# Patient Record
Sex: FEMALE | Race: WHITE | NOT HISPANIC OR LATINO | URBAN - METROPOLITAN AREA
[De-identification: names, ages, dates, MRNs, and addresses within clinical notes are randomized per-mention and may not be internally consistent; named-entity substitution may affect disease eponyms.]

---
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
## 2024-11-19 PROCEDURE — 87081 CULTURE SCREEN ONLY: CPT

## 2025-07-25 PROCEDURE — 87086 URINE CULTURE/COLONY COUNT: CPT

## 2025-07-26 ENCOUNTER — HOSPITAL ENCOUNTER (EMERGENCY)
Facility: HOSPITAL | Age: 20
Discharge: HOME | End: 2025-07-26
Attending: EMERGENCY MEDICINE
Payer: COMMERCIAL

## 2025-07-26 ENCOUNTER — APPOINTMENT (OUTPATIENT)
Dept: CT IMAGING | Facility: HOSPITAL | Age: 20
End: 2025-07-26
Payer: COMMERCIAL

## 2025-07-26 VITALS
HEIGHT: 64 IN | HEART RATE: 87 BPM | TEMPERATURE: 98.1 F | DIASTOLIC BLOOD PRESSURE: 55 MMHG | RESPIRATION RATE: 18 BRPM | SYSTOLIC BLOOD PRESSURE: 107 MMHG | OXYGEN SATURATION: 99 % | WEIGHT: 144.62 LBS | BODY MASS INDEX: 24.69 KG/M2

## 2025-07-26 DIAGNOSIS — R10.2 SUPRAPUBIC PAIN: ICD-10-CM

## 2025-07-26 DIAGNOSIS — R10.31 RIGHT LOWER QUADRANT ABDOMINAL PAIN: Primary | ICD-10-CM

## 2025-07-26 LAB
ALBUMIN SERPL-MCNC: 4.9 G/DL (ref 3.5–5.2)
ALBUMIN/GLOB SERPL: 1.8 G/DL
ALP SERPL-CCNC: 86 U/L (ref 39–117)
ALT SERPL W P-5'-P-CCNC: 15 U/L (ref 1–33)
ANION GAP SERPL CALCULATED.3IONS-SCNC: 10.1 MMOL/L (ref 5–15)
AST SERPL-CCNC: 24 U/L (ref 1–32)
BACTERIA UR QL AUTO: ABNORMAL /HPF
BASOPHILS # BLD AUTO: 0.08 10*3/MM3 (ref 0–0.2)
BASOPHILS NFR BLD AUTO: 1.7 % (ref 0–1.5)
BILIRUB SERPL-MCNC: 0.3 MG/DL (ref 0–1.2)
BILIRUB UR QL STRIP: NEGATIVE
BUN SERPL-MCNC: 13.7 MG/DL (ref 6–20)
BUN/CREAT SERPL: 14 (ref 7–25)
CALCIUM SPEC-SCNC: 9.8 MG/DL (ref 8.6–10.5)
CHLORIDE SERPL-SCNC: 104 MMOL/L (ref 98–107)
CLARITY UR: CLEAR
CO2 SERPL-SCNC: 25.9 MMOL/L (ref 22–29)
COLOR UR: YELLOW
CREAT SERPL-MCNC: 0.98 MG/DL (ref 0.57–1)
DEPRECATED RDW RBC AUTO: 39.8 FL (ref 37–54)
EGFRCR SERPLBLD CKD-EPI 2021: 84.4 ML/MIN/1.73
EOSINOPHIL # BLD AUTO: 0.12 10*3/MM3 (ref 0–0.4)
EOSINOPHIL NFR BLD AUTO: 2.5 % (ref 0.3–6.2)
ERYTHROCYTE [DISTWIDTH] IN BLOOD BY AUTOMATED COUNT: 12.1 % (ref 12.3–15.4)
GLOBULIN UR ELPH-MCNC: 2.8 GM/DL
GLUCOSE SERPL-MCNC: 83 MG/DL (ref 65–99)
GLUCOSE UR STRIP-MCNC: NEGATIVE MG/DL
HCG INTACT+B SERPL-ACNC: <0.5 MIU/ML
HCT VFR BLD AUTO: 41 % (ref 34–46.6)
HGB BLD-MCNC: 13.5 G/DL (ref 12–15.9)
HGB UR QL STRIP.AUTO: NEGATIVE
HOLD SPECIMEN: NORMAL
HOLD SPECIMEN: NORMAL
HYALINE CASTS UR QL AUTO: ABNORMAL /LPF
IMM GRANULOCYTES # BLD AUTO: 0 10*3/MM3 (ref 0–0.05)
IMM GRANULOCYTES NFR BLD AUTO: 0 % (ref 0–0.5)
KETONES UR QL STRIP: NEGATIVE
LEUKOCYTE ESTERASE UR QL STRIP.AUTO: ABNORMAL
LYMPHOCYTES # BLD AUTO: 1.96 10*3/MM3 (ref 0.7–3.1)
LYMPHOCYTES NFR BLD AUTO: 40.7 % (ref 19.6–45.3)
MCH RBC QN AUTO: 29.7 PG (ref 26.6–33)
MCHC RBC AUTO-ENTMCNC: 32.9 G/DL (ref 31.5–35.7)
MCV RBC AUTO: 90.1 FL (ref 79–97)
MONOCYTES # BLD AUTO: 0.52 10*3/MM3 (ref 0.1–0.9)
MONOCYTES NFR BLD AUTO: 10.8 % (ref 5–12)
NEUTROPHILS NFR BLD AUTO: 2.13 10*3/MM3 (ref 1.7–7)
NEUTROPHILS NFR BLD AUTO: 44.3 % (ref 42.7–76)
NITRITE UR QL STRIP: NEGATIVE
NRBC BLD AUTO-RTO: 0 /100 WBC (ref 0–0.2)
PH UR STRIP.AUTO: 6 [PH] (ref 5–8)
PLATELET # BLD AUTO: 220 10*3/MM3 (ref 140–450)
PMV BLD AUTO: 9.9 FL (ref 6–12)
POTASSIUM SERPL-SCNC: 4 MMOL/L (ref 3.5–5.2)
PROT SERPL-MCNC: 7.7 G/DL (ref 6–8.5)
PROT UR QL STRIP: NEGATIVE
RBC # BLD AUTO: 4.55 10*6/MM3 (ref 3.77–5.28)
RBC # UR STRIP: ABNORMAL /HPF
REF LAB TEST METHOD: ABNORMAL
SODIUM SERPL-SCNC: 140 MMOL/L (ref 136–145)
SP GR UR STRIP: 1.02 (ref 1–1.03)
SQUAMOUS #/AREA URNS HPF: ABNORMAL /HPF
UROBILINOGEN UR QL STRIP: ABNORMAL
WBC # UR STRIP: ABNORMAL /HPF
WBC NRBC COR # BLD AUTO: 4.81 10*3/MM3 (ref 3.4–10.8)
WHOLE BLOOD HOLD COAG: NORMAL
WHOLE BLOOD HOLD SPECIMEN: NORMAL

## 2025-07-26 PROCEDURE — 25810000003 SODIUM CHLORIDE 0.9 % SOLUTION

## 2025-07-26 PROCEDURE — 96360 HYDRATION IV INFUSION INIT: CPT

## 2025-07-26 PROCEDURE — 81001 URINALYSIS AUTO W/SCOPE: CPT | Performed by: EMERGENCY MEDICINE

## 2025-07-26 PROCEDURE — 99285 EMERGENCY DEPT VISIT HI MDM: CPT

## 2025-07-26 PROCEDURE — 36415 COLL VENOUS BLD VENIPUNCTURE: CPT

## 2025-07-26 PROCEDURE — 85025 COMPLETE CBC W/AUTO DIFF WBC: CPT

## 2025-07-26 PROCEDURE — 80053 COMPREHEN METABOLIC PANEL: CPT | Performed by: EMERGENCY MEDICINE

## 2025-07-26 PROCEDURE — 84702 CHORIONIC GONADOTROPIN TEST: CPT | Performed by: EMERGENCY MEDICINE

## 2025-07-26 PROCEDURE — 25510000001 IOPAMIDOL PER 1 ML: Performed by: EMERGENCY MEDICINE

## 2025-07-26 PROCEDURE — 74177 CT ABD & PELVIS W/CONTRAST: CPT

## 2025-07-26 RX ORDER — ONDANSETRON 4 MG/1
4 TABLET, ORALLY DISINTEGRATING ORAL 4 TIMES DAILY PRN
Qty: 10 TABLET | Refills: 0 | Status: SHIPPED | OUTPATIENT
Start: 2025-07-26

## 2025-07-26 RX ORDER — IOPAMIDOL 755 MG/ML
100 INJECTION, SOLUTION INTRAVASCULAR
Status: COMPLETED | OUTPATIENT
Start: 2025-07-26 | End: 2025-07-26

## 2025-07-26 RX ORDER — IBUPROFEN 600 MG/1
600 TABLET, FILM COATED ORAL EVERY 6 HOURS PRN
Qty: 20 TABLET | Refills: 0 | Status: SHIPPED | OUTPATIENT
Start: 2025-07-26

## 2025-07-26 RX ADMIN — IOPAMIDOL 75 ML: 755 INJECTION, SOLUTION INTRAVENOUS at 12:48

## 2025-07-26 RX ADMIN — SODIUM CHLORIDE 1000 ML: 9 INJECTION, SOLUTION INTRAVENOUS at 12:56

## 2025-07-26 NOTE — ED PROVIDER NOTES
Time: 11:26 AM EDT  Date of encounter:  7/26/2025  Room: [facility]    Independent Historian/Clinical History and Information was obtained by:   Patient    History is limited by: N/A    Chief Complaint: abdominal pain       History of Present Illness:  Patient is a 20 y.o. year old female who presents to the emergency department for evaluation of right lower abdominal and right suprapubic pain.  Patient states her pain started abruptly yesterday morning at around 8:00.  The pain lasted for approximately 4 hours and did improve.  It then returned and has been constant since.  She did go to a local urgent care center where she was diagnosed with a urinary tract infection and prescribed antibiotics.  She however has had no dysuria and no burning.  She has had a slight increase in urine frequency but states for the most part she has had no symptoms of a urinary tract infection.  She also denies nausea, vomiting, diarrhea, fever, or chills.  She does not have menstrual periods due to current birth control meds.  There is a family history of kidney stones however she has never had one.  She also has concerns about a possible ruptured ovarian cyst.  At rest her pain is 0 but does increase with movement and palpation.      Patient Care Team  Primary Care Provider: Patricia Lemon APRN    Past Medical History:     No Known Allergies  History reviewed. No pertinent past medical history.  History reviewed. No pertinent surgical history.  History reviewed. No pertinent family history.    Home Medications:  Prior to Admission medications    Medication Sig Start Date End Date Taking? Authorizing Provider   cetirizine (zyrTEC) 10 MG tablet TAKE 1 TABLET BY MOUTH EVERY DAY FOR 30 DAYS    ProviderSalazar MD   fluticasone (Flonase Allergy Relief) 50 MCG/ACT nasal spray Daily.    Salazar Davila MD   levocetirizine (XYZAL) 5 MG tablet Daily. 8/12/24 8/7/25  Salazar Davila MD   nitrofurantoin, macrocrystal-monohydrate,  "(MACROBID) 100 MG capsule Take 1 capsule by mouth 2 (Two) Times a Day for 7 days. 7/25/25 8/1/25  Marilyn Joe PA-C        Social History:   Social History     Tobacco Use    Smoking status: Never     Passive exposure: Never    Smokeless tobacco: Never   Vaping Use    Vaping status: Never Used   Substance Use Topics    Alcohol use: Never    Drug use: Defer         Review of Systems:  Review of Systems     I performed a review of systems today, which was all negative, except for the positives found in the HPI above.      Physical Exam:  /81 (Patient Position: Sitting)   Pulse 72   Temp 98.1 °F (36.7 °C) (Oral)   Resp 16   Ht 162.6 cm (64\")   Wt 65.6 kg (144 lb 10 oz)   SpO2 100%   BMI 24.82 kg/m²     Physical Exam   General:  Awake alert no apparent distress    Head: Normocephalic, atraumatic, eyes PERRLA EOMI, nose normal, oropharynx normal    Neck: Supple, no meningismus, no cervical lymphadenopathy or JVD    Heart: Regular rate and rhythm, no murmurs or rubs, 2+ radial pulses    Lungs: Clear to auscultation bilaterally, no wheezing or rhonchi or rales, no respiratory distress    Abdomen: Soft, nontender, nondistended, no signs of peritonitis. Mild tenderness to deep palpation to right suprapubic area. At rest pt has no pain.     Skin: Warm, dry, no rash    Musculoskeletal: Normal range of motion, no obvious deformities, no edema noted    Neurologic: Awake, alert, oriented x 3, no motor or sensory deficits appreciated    Psych: No suicidal or homicidal ideations, no psychosis              Medical Decision Making:      Comorbidities that affect care:    None    External Notes reviewed:    Previous Clinic Note: Reviewed patient's urgent care note from yesterday and Previous Labs: Reviewed patient's labs completed at urgent care yesterday specifically urinalysis for comparison and trending purposes      The following orders were placed and all results were independently analyzed by me:  Orders Placed " This Encounter   Procedures    CT Abdomen Pelvis With Contrast    [place] Draw    Comprehensive Metabolic Panel    Urinalysis With Culture If Indicated - Urine, Clean Catch    CBC Auto Differential    Urinalysis, Microscopic Only - Urine, Clean Catch    hCG, Quantitative, Pregnancy    CBC & Differential    Green Top (Gel)    Lavender Top    Gold Top - SST    Light Blue Top       Medications Given in the Emergency Department:  Medications   sodium chloride 0.9 % bolus 1,000 mL (1,000 mL Intravenous New Bag 7/26/25 1256)   iopamidol (ISOVUE-370) 76 % injection 100 mL (75 mL Intravenous Given 7/26/25 1248)        ED Course:    ED Course as of 07/26/25 1547   Sat Jul 26, 2025   1537 I have updated patient with all results.we discussed her normal labs as well as CT findings.  We discussed the possibility that this could be ovarian related due to her family history of ovarian cyst.  Patient confirms that she has an OB/GYN to follow-up with if she continues to have pain.  Patient continues to have no pain at this time. [MS]      ED Course User Index  [MS] Diamante Taylor APRN       Labs:    Lab Results (last 24 hours)       Procedure Component Value Units Date/Time    POC Urinalysis Dipstick, Multipro (Automated Dipstick) [[id number]]  (Abnormal) Collected: 07/25/25 1933    Specimen: Urine Updated: 07/25/25 1934     Color Other     Clarity, UA Slightly Cloudy     Glucose, UA Negative mg/dL      Bilirubin Negative     Ketones, UA Negative     Specific Gravity  1.005     Blood, UA Negative     pH, Urine 6.0     Protein, POC Negative mg/dL      Urobilinogen, UA 0.2 E.U./dL     Nitrite, UA Negative     Leukocytes Small (1+)    POC Pregnancy, Urine [[id number]]  (Normal) Collected: 07/25/25 1935    Specimen: Urine Updated: 07/25/25 1937     HCG, Urine, QL Negative     Lot Number #[id number]     Internal Positive Control Passed     Internal Negative Control Passed     Expiration Date 10/17/26    Urine Culture - Urine,  Urine, Clean Catch [[id number]] Collected: 07/25/25 2001    Specimen: Urine, Clean Catch Updated: 07/25/25 2003    CBC & Differential [[id number]]  (Abnormal) Collected: 07/26/25 1059    Specimen: Blood Updated: 07/26/25 1106    Narrative:      The following orders were created for panel order CBC & Differential.  Procedure                               Abnormality         Status                     ---------                               -----------         ------                     CBC Auto Differential[[id number]]        Abnormal            Final result                 Please view results for these tests on the individual orders.    Comprehensive Metabolic Panel [[id number]] Collected: 07/26/25 1059    Specimen: Blood Updated: 07/26/25 1121     Glucose 83 mg/dL      BUN 13.7 mg/dL      Creatinine 0.98 mg/dL      Sodium 140 mmol/L      Potassium 4.0 mmol/L      Chloride 104 mmol/L      CO2 25.9 mmol/L      Calcium 9.8 mg/dL      Total Protein 7.7 g/dL      Albumin 4.9 g/dL      ALT (SGPT) 15 U/L      AST (SGOT) 24 U/L      Alkaline Phosphatase 86 U/L      Total Bilirubin 0.3 mg/dL      Globulin 2.8 gm/dL      A/G Ratio 1.8 g/dL      BUN/Creatinine Ratio 14.0     Anion Gap 10.1 mmol/L      eGFR 84.4 mL/min/1.73     Narrative:      GFR Categories in Chronic Kidney Disease (CKD)              GFR Category          GFR (mL/min/1.73)    Interpretation  G1                    90 or greater        Normal or high (1)  G2                    60-89                Mild decrease (1)  G3a                   45-59                Mild to moderate decrease  G3b                   30-44                Moderate to severe decrease  G4                    15-29                Severe decrease  G5                    14 or less           Kidney failure    (1)In the absence of evidence of kidney disease, neither GFR category G1 or G2 fulfill the criteria for CKD.    eGFR calculation 2021 CKD-EPI creatinine equation, which does not include race as  a factor    CBC Auto Differential [[id number]]  (Abnormal) Collected: 07/26/25 1059    Specimen: Blood Updated: 07/26/25 1106     WBC 4.81 10*3/mm3      RBC 4.55 10*6/mm3      Hemoglobin 13.5 g/dL      Hematocrit 41.0 %      MCV 90.1 fL      MCH 29.7 pg      MCHC 32.9 g/dL      RDW 12.1 %      RDW-SD 39.8 fl      MPV 9.9 fL      Platelets 220 10*3/mm3      Neutrophil % 44.3 %      Lymphocyte % 40.7 %      Monocyte % 10.8 %      Eosinophil % 2.5 %      Basophil % 1.7 %      Immature Grans % 0.0 %      Neutrophils, Absolute 2.13 10*3/mm3      Lymphocytes, Absolute 1.96 10*3/mm3      Monocytes, Absolute 0.52 10*3/mm3      Eosinophils, Absolute 0.12 10*3/mm3      Basophils, Absolute 0.08 10*3/mm3      Immature Grans, Absolute 0.00 10*3/mm3      nRBC 0.0 /100 WBC     hCG, Quantitative, Pregnancy [[id number]] Collected: 07/26/25 1059    Specimen: Blood Updated: 07/26/25 1231     HCG Quantitative <0.50 mIU/mL     Narrative:      HCG Ranges by Gestational Age    Females - non-pregnant premenopausal   </= 1mIU/mL HCG  Females - postmenopausal               </= 7mIU/mL HCG    3 Weeks       5.4   -      72 mIU/mL  4 Weeks      10.2   -     708 mIU/mL  5 Weeks       217   -   8,245 mIU/mL  6 Weeks       152   -  32,177 mIU/mL  7 Weeks     4,059   - 153,767 mIU/mL  8 Weeks    31,366   - 149,094 mIU/mL  9 Weeks    59,109   - 135,901 mIU/mL  10 Weeks   44,186   - 170,409 mIU/mL  12 Weeks   27,107   - 201,615 mIU/mL  14 Weeks   24,302   -  93,646 mIU/mL  15 Weeks   12,540   -  69,747 mIU/mL  16 Weeks    8,904   -  55,332 mIU/mL  17 Weeks    8,240   -  51,793 mIU/mL  18 Weeks    9,649   -  55,271 mIU/mL      Urinalysis With Culture If Indicated - Urine, Clean Catch [[id number]]  (Abnormal) Collected: 07/26/25 1122    Specimen: Urine, Clean Catch Updated: 07/26/25 1134     Color, UA Yellow     Appearance, UA Clear     pH, UA 6.0     Specific Gravity, UA 1.019     Glucose, UA Negative     Ketones, UA Negative     Bilirubin, UA Negative      Blood, UA Negative     Protein, UA Negative     Leuk Esterase, UA Trace     Nitrite, UA Negative     Urobilinogen, UA 0.2 E.U./dL    Narrative:      In absence of clinical symptoms, the presence of pyuria, bacteria, and/or nitrites on the urinalysis result does not correlate with infection.    Urinalysis, Microscopic Only - Urine, Clean Catch [[id number]]  (Abnormal) Collected: 07/26/25 1122    Specimen: Urine, Clean Catch Updated: 07/26/25 1134     RBC, UA 0-2 /HPF      WBC, UA 3-5 /HPF      Comment: Urine culture not indicated.        Bacteria, UA 1+ /HPF      Squamous Epithelial Cells, UA 3-6 /HPF      Hyaline Casts, UA None Seen /LPF      Methodology Automated Microscopy             Imaging:    CT Abdomen Pelvis With Contrast  Result Date: 7/26/2025  CT ABDOMEN PELVIS W CONTRAST Date of Exam: 7/26/2025 12:41 PM EDT Indication: right lower quadrant pain. Comparison: None available. Technique: Axial CT images were obtained of the abdomen and pelvis after the uneventful intravenous administration of iodinated contrast. Reconstructed coronal and sagittal images were also obtained. Automated exposure control and iterative construction methods were used. FINDINGS: Lung bases: No masses. No consolidation. Liver:No masses. No intrahepatic biliary ductal dilatation. Spleen:No masses. No perisplenic hematoma. Pancreas:No pancreatic masses. No evidence of pancreatitis. Gallbladder and common bile duct:No evidence of cholelithiasis. No evidence of cholecystitis. Adrenal glands:No adrenal masses Kidneys and ureters:No kidney stones. No renal masses.No calculi present within the ureters. Normal caliber ureters. Urinary bladder:No urinary bladder wall thickening. No bladder masses. Small bowel:Normal caliber small bowel. Large bowel:No diverticulosis or diverticulitis. No large bowel masses are appreciated Appendix: Normal appendix GENITOURINARY: The uterus and adnexa are normal. Ascites or pneumoperitoneum:None.  Adenopathy:None present Osseous structures: The proximal femurs are intact. The pubic bones are intact. The sacrum and sacroiliac joints are normal. Lumbar vertebral body height and alignment are normal. The sacrum is intact. Other findings: None     Normal appendix. No acute abdominal or pelvic pathology. Electronically Signed: Daljit Fitzgerald MD  7/26/2025 1:08 PM EDT  Workstation ID: [id number]        Differential Diagnosis and Discussion:    Abdominal Pain: Based on the patient's signs and symptoms, I considered abdominal aortic aneurysm, small bowel obstruction, pancreatitis, acute cholecystitis, acute appendecitis, peptic ulcer disease, gastritis, colitis, endocrine disorders, irritable bowel syndrome and other differential diagnosis an etiology of the patient's abdominal pain.    PROCEDURES:    Labs were collected in the emergency department and all labs were reviewed and interpreted by me.  CT scan was performed in the emergency department and the CT scan radiology impression was interpreted by me.    No orders to display       Procedures    MDM     Amount and/or Complexity of Data Reviewed  Clinical lab tests: reviewed                       Patient Care Considerations:    SEPSIS was considered but is NOT present in the emergency department as SIRS criteria is not present.      Consultants/Shared Management Plan:        Social Determinants of Health:    Patient is independent, reliable, and has access to care.       Disposition and Care Coordination:    Discharged: The patient is suitable and stable for discharge with no need for consideration of admission.    I have explained the patient´s condition, diagnoses and treatment plan based on the information available to me at this time. I have answered questions and addressed any concerns. The patient has a good  understanding of the patient´s diagnosis, condition, and treatment plan as can be expected at this point. The vital signs have been stable. The patient´s  condition is stable and appropriate for discharge from the emergency department.      The patient will pursue further outpatient evaluation with the primary care physician or other designated or consulting physician as outlined in the discharge instructions. They are agreeable to this plan of care and follow-up instructions have been explained in detail. The patient has received these instructions in written format and has expressed an understanding of the discharge instructions. The patient is aware that any significant change in condition or worsening of symptoms should prompt an immediate return to this or the closest emergency department or call to 911.    Final diagnoses:   Right lower quadrant abdominal pain   Suprapubic pain        ED Disposition       ED Disposition   Discharge    Condition   Stable    Comment   --               This medical record created using voice recognition software.           Diamante Taylor, APRN  07/26/25 1549

## 2025-07-26 NOTE — DISCHARGE INSTRUCTIONS
Please note that your lab work was within normal limits with the exception of a very small amount of bacteria in your urine.  So continue to take the antibiotic you are prescribed yesterday until it is gone.  I have prescribed you an anti-inflammatory to take for your discomfort as well as an antinausea medication should you need it.  Your CT was also within acceptable limits.  If you continue to have your discomfort after completing all of the antibiotics please follow-up with your primary care provider or your OB/GYN as we discussed during your ER visit.  If it anytime however your pain increases, if you develop a fever that you cannot control with Tylenol or Motrin, severe nausea, vomiting, or diarrhea please return to the ER immediately otherwise follow-up with your PCP or OB/GYN within 3 to 5 days to be reevaluated if your pain continues

## 2025-07-31 NOTE — ED PROVIDER NOTES
"SHARED VISIT ATTESTATION:    This visit was performed by myself and an APC.  I personally approved the management plan/medical decision making and take responsibility for the patient management.      SHARED VISIT NOTE:    Patient is 20 y.o. year old female that presents to the ED for evaluation of right lower quadrant and suprapubic abdominal pain.  Pain began yesterday.     Physical Exam    ED Course:    /55 (BP Location: Right arm, Patient Position: Sitting)   Pulse 87   Temp 98.1 °F (36.7 °C) (Oral)   Resp 18   Ht 162.6 cm (64\")   Wt 65.6 kg (144 lb 10 oz)   SpO2 99%   BMI 24.82 kg/m²       The following orders were placed and all results were independently analyzed by me:  Orders Placed This Encounter   Procedures    CT Abdomen Pelvis With Contrast    [place] Draw    Comprehensive Metabolic Panel    Urinalysis With Culture If Indicated - Urine, Clean Catch    CBC Auto Differential    Urinalysis, Microscopic Only - Urine, Clean Catch    hCG, Quantitative, Pregnancy    CBC & Differential    Green Top (Gel)    Lavender Top    Gold Top - SST    Light Blue Top       Medications Given in the Emergency Department:  Medications   sodium chloride 0.9 % bolus 1,000 mL (0 mL Intravenous Stopped 7/26/25 1548)   iopamidol (ISOVUE-370) 76 % injection 100 mL (75 mL Intravenous Given 7/26/25 1248)        ED Course:    ED Course as of 07/31/25 1154   Sat Jul 26, 2025   1537 I have updated patient with all results.we discussed her normal labs as well as CT findings.  We discussed the possibility that this could be ovarian related due to her family history of ovarian cyst.  Patient confirms that she has an OB/GYN to follow-up with if she continues to have pain.  Patient continues to have no pain at this time. [MS]      ED Course User Index  [MS] Diamante Taylor, [place]       Labs:    Lab Results (last 24 hours)       ** No results found for the last 24 hours. **             Imaging:    No Radiology Exams Resulted " Within Past 24 Hours    MDM:    Procedures    Labs were collected in the emergency department and all labs were reviewed and interpreted by me.  CT scan was performed in the emergency department and the CT scan radiology impression was interpreted by me.                     Fredo Burr DO  11:54 EDT  07/31/25         Fredo Burr DO  07/31/25 1155